# Patient Record
Sex: MALE | Race: WHITE | HISPANIC OR LATINO | Employment: FULL TIME | URBAN - METROPOLITAN AREA
[De-identification: names, ages, dates, MRNs, and addresses within clinical notes are randomized per-mention and may not be internally consistent; named-entity substitution may affect disease eponyms.]

---

## 2023-01-31 ENCOUNTER — HOSPITAL ENCOUNTER (EMERGENCY)
Facility: CLINIC | Age: 23
Discharge: HOME | End: 2023-01-31
Attending: EMERGENCY MEDICINE | Admitting: EMERGENCY MEDICINE

## 2023-01-31 VITALS
RESPIRATION RATE: 20 BRPM | SYSTOLIC BLOOD PRESSURE: 159 MMHG | TEMPERATURE: 98.5 F | HEART RATE: 66 BPM | DIASTOLIC BLOOD PRESSURE: 99 MMHG | OXYGEN SATURATION: 100 %

## 2023-01-31 DIAGNOSIS — L60.0 INGROWN TOENAIL OF RIGHT FOOT: ICD-10-CM

## 2023-01-31 PROCEDURE — 99283 EMERGENCY DEPT VISIT LOW MDM: CPT | Performed by: EMERGENCY MEDICINE

## 2023-01-31 RX ORDER — BUPIVACAINE HYDROCHLORIDE 5 MG/ML
INJECTION, SOLUTION EPIDURAL; INTRACAUDAL
Status: DISCONTINUED
Start: 2023-01-31 | End: 2023-01-31 | Stop reason: HOSPADM

## 2023-01-31 NOTE — ED PROVIDER NOTES
History   Chief Complaint:  Toe Pain     HPI History supplemented by electronic chart review   History obtained primarily from the patient in [place], declines official     Gustavo FLYNN Duke is a 22 year old male without any known chronic medical problems who presents to the emergency department for evaluation of pain and redness to the lateral aspect of his right great toenail that is been worsening for the past month.  No trauma.  He has not taken any pain medication.  No numbness or tingling.  No prior similar issues.  No history of diabetes or other medical problems.  He has not been seen for this before.  He has nonradiating pain to the tip of his great toe that he would like addressed.  No fevers, vomiting, or rash spreading up his foot.  No other symptoms lately.    ROS:  Review of Systems  All the systems are reviewed negative except as above in HPI    Allergies:  No Known Allergies     Medications:    The patient is currently on no regular medications.     Past Medical History:    The patient denies past medical history.     Social History:  Arrived by private vehicle.  He lives with a sibling.  Currently unemployed.    Physical Exam     Patient Vitals for the past 24 hrs:   BP Temp Temp src Pulse Resp SpO2   01/31/23 1357 (!) 159/99 98.5  F (36.9  C) Temporal 66 20 100 %      Physical Exam  General: Nontoxic-appearing male who ambulated into room 41  HENT: wearing mask, no major facial deformity noted  CV:  regular rhythm, soft compartments in RLE, cap refill normal in R toes, normal right DP and PT pulses   Resp: normal effort, speaks in full phrases, no stridor, no cough observed  MSK:  Extremities: Moderate tenderness and mild swelling to the lateral aspect of the right great toe lateral to the toenail which is ingrown, no purulence, scant dried blood around the lateral aspect of the toenail  Skin:   Minimal erythema overlying the skin lateral to the right great toenail, no purulence, no  streaking erythema up the toe or dorsum of the foot  Neuro: awake, alert, responds appropriately to commands, SILT throughout RLE  Psych: cooperative    Emergency Department Course     Procedures   Procedure - Excision of ingrown great toenail  Performed by: GEOVANNI Panchal MD with assistance from ISMAEL Leiva.  I (GEOVANNI Panchal MD) was present and had hands-on involvement at bedside throughout the entire procedure.  After verbal consent was obtained from the patient, digital block was performed using 0.5% bupivacaine without epinephrine, using a 27-gauge needle, achieving good anesthesia.  We then used blunt dissection under the lateral aspect of the ingrown toenail, followed by using scissors to excise a triangular-shaped portion of the lateral distal great toenail.  There was scant bleeding, less than 1 cc.  No purulent drainage.  Dressing was subsequently placed.  The patient tolerated the procedure well and there were no apparent complications.     Emergency Department Course & Assessments:       Interventions:  Medications   bupivacaine (PF) (MARCAINE) 0.5 % injection       Assessments:  1410 I obtained history and examined the patient as noted above.   1422 I rechecked the patient and performed a toenail removal procedure at this time, see procedure note above.    Social Determinants of Health affecting care:  Does not speak English, does not have primary clinic     Disposition:  The patient was discharged to home.     Impression & Plan      Medical Decision Making:  He had evidence of an ingrown right great toenail, such that I felt procedural excision and removal was indicated.  This was performed as documented above.  No signs of purulent drainage, no abscess, and therefore I do not think there is any indication for antibiotics at this time, though we should use warm soaks and can use over-the-counter analgesics as needed.  I recommended follow-up with a podiatrist.  More care will be needed with nail trimming  in the future to minimize risk of recurrence.  He has no known diabetes or other identified immunocompromise.  Highly doubt bony pathology such as fracture or dislocation, no evidence of acute neurovascular compromise or soft tissue infection warranting antibiotics.  He is welcome back for crisis and otherwise would benefit from close follow-up in clinic.  Dressing placed and he was discharged home in improved condition.    Diagnosis:    ICD-10-CM    1. Ingrown toenail of right foot  L60.0          Scribe Disclosure:  I, Leticia Castillo, am serving as a scribe at 2:21 PM on 1/31/2023 to document services personally performed by Duong Panchal MD based on my observations and the provider's statements to me.     1/31/2023   Duong Panchal MD Reitsema, Jeffrey Alan, MD  01/31/23 1200